# Patient Record
Sex: FEMALE | Race: WHITE | Employment: FULL TIME | URBAN - METROPOLITAN AREA
[De-identification: names, ages, dates, MRNs, and addresses within clinical notes are randomized per-mention and may not be internally consistent; named-entity substitution may affect disease eponyms.]

---

## 2021-08-27 ENCOUNTER — HOSPITAL ENCOUNTER (EMERGENCY)
Age: 46
Discharge: HOME | End: 2021-08-27
Payer: COMMERCIAL

## 2021-08-27 VITALS
WEIGHT: 170 LBS | HEART RATE: 68 BPM | DIASTOLIC BLOOD PRESSURE: 98 MMHG | RESPIRATION RATE: 18 BRPM | OXYGEN SATURATION: 98 % | TEMPERATURE: 97.8 F | SYSTOLIC BLOOD PRESSURE: 177 MMHG

## 2021-08-27 DIAGNOSIS — R03.0 ELEVATED BLOOD PRESSURE READING: ICD-10-CM

## 2021-08-27 DIAGNOSIS — U07.1 COVID-19: Primary | ICD-10-CM

## 2021-08-27 PROCEDURE — 99211 OFF/OP EST MAY X REQ PHY/QHP: CPT

## 2021-08-27 RX ORDER — ASCORBIC ACID 500 MG
500 TABLET ORAL 2 TIMES DAILY
Qty: 14 TABLET | Refills: 0 | Status: SHIPPED | OUTPATIENT
Start: 2021-08-27 | End: 2022-06-25

## 2021-08-27 RX ORDER — ZINC SULFATE 50(220)MG
50 CAPSULE ORAL DAILY
Qty: 7 CAPSULE | Refills: 0 | Status: SHIPPED | OUTPATIENT
Start: 2021-08-27 | End: 2022-06-25

## 2021-08-27 NOTE — ED PROVIDER NOTES
HPI:  8/27/21, Time: 9:21 AM EDT         Carrie Toro is a 40 y.o. female presenting to the ED for evaluation. She has been sick for a few days she has a cough, chest congestion achiness fatigue and sore throat. She took 2 rapid Covid test at home and they were both positive. She is here because she needs a work excuse. She is not complaining of chest pain or shortness of breath. Review of Systems:   A complete review of systems was performed and pertinent positives and negatives are stated within HPI, all other systems reviewed and are negative.          --------------------------------------------- PAST HISTORY ---------------------------------------------  Past Medical History:  has no past medical history on file. Past Surgical History:  has a past surgical history that includes Patent ductus arterious ligation. Social History:  reports that she has never smoked. She has never used smokeless tobacco. She reports that she does not drink alcohol. Family History: family history is not on file. The patients home medications have been reviewed. Allergies: Patient has no known allergies. -------------------------------------------------- RESULTS -------------------------------------------------  All laboratory and radiology results have been personally reviewed by myself   LABS:  No results found for this visit on 08/27/21. RADIOLOGY:  Interpreted by Radiologist.  No orders to display       ------------------------- NURSING NOTES AND VITALS REVIEWED ---------------------------   The nursing notes within the ED encounter and vital signs as below have been reviewed.    BP (!) 177/98   Pulse 68   Temp 97.8 °F (36.6 °C) (Infrared)   Resp 18   Wt 170 lb (77.1 kg)   LMP 08/22/2021   SpO2 98%   Oxygen Saturation Interpretation: Normal      ---------------------------------------------------PHYSICAL EXAM--------------------------------------      Constitutional/General: Alert and oriented x3, well appearing, non toxic in NAD  Head: normocephalic  Eyes:clear conjunctiva  Mouth:  handling secretions, no trismus  Neck: Supple, full ROM,   Pulmonary: Lungs clear to auscultation bilaterally, no wheezes, rales, or rhonchi. Not in respiratory distress  Cardiovascular:  Regular rate and rhythm, no murmurs, gallops, or rubs. 2+ distal pulses  Extremities: Moves all extremities x 4. Warm and well perfused  Skin: warm and dry without rash  Neurologic: GCS 15,  Psych: Normal Affect      ------------------------------ ED COURSE/MEDICAL DECISION MAKING----------------------  Medications - No data to display      ED COURSE:       Medical Decision Making:    Patient appears well she does not appear ill or toxic her sat is 98% her heart rate 68 her temperature is 97.8. She took 2 rapid Covid test at home and they were both positive. She said she is here because she needs a work excuse to be off work because of this. I did advise her that she has to stay in quarantine for 10 days. She has to stay in quarantine longer if she still has symptoms. I did advise her if she has any worsening she needs to go to the emergency department if she develops chest pain weakness shortness of breath or any worsening symptoms. Her blood pressure is elevated today advised her she needs to follow-up with her doctor to have that reevaluated. I did put her on the zinc and vitamin C. She was given a work note.            --------------------------------- [address] ---------------------------------    IMPRESSION  1. COVID-19    2. Elevated blood pressure reading        DISPOSITION  Disposition: Discharge to home  Patient condition is good      NOTE: This report was transcribed using voice recognition software.  Every effort was made to ensure accuracy; however, inadvertent computerized transcription errors may be present     JALEN Wilkes - CNP  08/27/21 [id number]

## 2022-06-24 PROCEDURE — 99283 EMERGENCY DEPT VISIT LOW MDM: CPT

## 2022-06-25 ENCOUNTER — HOSPITAL ENCOUNTER (EMERGENCY)
Age: 47
Discharge: HOME | End: 2022-06-25
Attending: EMERGENCY MEDICINE
Payer: COMMERCIAL

## 2022-06-25 VITALS
WEIGHT: 180 LBS | HEIGHT: 63 IN | OXYGEN SATURATION: 99 % | DIASTOLIC BLOOD PRESSURE: 72 MMHG | TEMPERATURE: 98.1 F | HEART RATE: 63 BPM | RESPIRATION RATE: 16 BRPM | BODY MASS INDEX: 31.89 KG/M2 | SYSTOLIC BLOOD PRESSURE: 148 MMHG

## 2022-06-25 DIAGNOSIS — K04.7 DENTAL INFECTION: Primary | ICD-10-CM

## 2022-06-25 PROCEDURE — 6370000000 HC RX 637 (ALT 250 FOR IP): Performed by: EMERGENCY MEDICINE

## 2022-06-25 RX ORDER — CLINDAMYCIN HYDROCHLORIDE 150 MG/1
300 CAPSULE ORAL ONCE
Status: COMPLETED | OUTPATIENT
Start: 2022-06-25 | End: 2022-06-25

## 2022-06-25 RX ORDER — CLINDAMYCIN HYDROCHLORIDE 150 MG/1
CAPSULE ORAL
Qty: 80 CAPSULE | Refills: 0 | Status: SHIPPED | OUTPATIENT
Start: 2022-06-25

## 2022-06-25 RX ADMIN — CLINDAMYCIN HYDROCHLORIDE 300 MG: 150 CAPSULE ORAL at 03:03

## 2022-06-25 ASSESSMENT — ENCOUNTER SYMPTOMS
BACK PAIN: 0
EYE PAIN: 0
WHEEZING: 0
EYE DISCHARGE: 0
FACIAL SWELLING: 1
SORE THROAT: 0
EYE REDNESS: 0
DIARRHEA: 0
VOMITING: 0
SINUS PRESSURE: 0
ABDOMINAL DISTENTION: 0
SHORTNESS OF BREATH: 0
COUGH: 0
NAUSEA: 0

## 2022-06-25 ASSESSMENT — PAIN - FUNCTIONAL ASSESSMENT
PAIN_FUNCTIONAL_ASSESSMENT: 0-10
PAIN_FUNCTIONAL_ASSESSMENT: NONE - DENIES PAIN

## 2022-06-25 ASSESSMENT — PAIN DESCRIPTION - LOCATION: LOCATION: FACE

## 2022-06-25 ASSESSMENT — PAIN DESCRIPTION - ORIENTATION: ORIENTATION: RIGHT

## 2022-06-25 ASSESSMENT — PAIN SCALES - GENERAL: PAINLEVEL_OUTOF10: 0

## 2022-06-25 NOTE — ED PROVIDER NOTES
Patient is a 55 y/o female who presents to the ED with right facial swelling. Patient states that she has a history of a bad tooth and this morning woke up with right facial swelling. She states that she took Tylenol and ibuprofen, however, the swelling has persisted. She denies any current dental pain. She denies any fever. She does have an appointment with her dentist scheduled for next week. Review of Systems   Constitutional: Negative for chills and fever. HENT: Positive for dental problem and facial swelling. Negative for ear pain, sinus pressure and sore throat. Eyes: Negative for pain, discharge and redness. Respiratory: Negative for cough, shortness of breath and wheezing. Cardiovascular: Negative for chest pain. Gastrointestinal: Negative for abdominal distention, diarrhea, nausea and vomiting. Genitourinary: Negative for dysuria and frequency. Musculoskeletal: Negative for arthralgias and back pain. Skin: Negative for rash and wound. Neurological: Negative for weakness and headaches. Hematological: Negative for adenopathy. All other systems reviewed and are negative. Physical Exam  Vitals and nursing note reviewed. Constitutional:       General: She is not in acute distress. HENT:      Head: Normocephalic and atraumatic. Right Ear: External ear normal.      Left Ear: External ear normal.      Nose: Nose normal.      Mouth/Throat:      Mouth: Mucous membranes are moist.      Pharynx: Oropharynx is clear. Comments: No obvious dental caries, abscess or ANUG. Eyes:      Conjunctiva/sclera: Conjunctivae normal.      Pupils: Pupils are equal, round, and reactive to light. Cardiovascular:      Rate and Rhythm: Normal rate and regular rhythm. Heart sounds: No murmur heard. Pulmonary:      Effort: Pulmonary effort is normal. No respiratory distress. Breath sounds: Normal breath sounds. No stridor. No wheezing, rhonchi or rales.    Abdominal: General: Bowel sounds are normal. There is no distension. Palpations: Abdomen is soft. Tenderness: There is no abdominal tenderness. There is no guarding. Musculoskeletal:         General: Normal range of motion. Cervical back: Normal range of motion and neck supple. Lymphadenopathy:      Cervical: No cervical adenopathy. Skin:     General: Skin is warm and dry. Neurological:      Mental Status: She is alert and oriented to person, place, and time. Procedures     [facility]             --------------------------------------------- PAST HISTORY ---------------------------------------------  Past Medical History:  has no past medical history on file. Past Surgical History:  has a past surgical history that includes Patent ductus arterious ligation. Social History:  reports that she has never smoked. She has never used smokeless tobacco. She reports that she does not drink alcohol and does not use drugs. Family History: family history is not on file. The patients home medications have been reviewed. Allergies: Patient has no known allergies. -------------------------------------------------- RESULTS -------------------------------------------------  Labs:  No results found for this visit on 06/25/22. Radiology:  No orders to display       ------------------------- NURSING NOTES AND VITALS REVIEWED ---------------------------  Date / Time Roomed:  6/25/2022 12:03 AM  ED Bed Assignment:  26/26    The nursing notes within the ED encounter and vital signs as below have been reviewed.    BP (!) 156/94   Pulse 67   Temp 98.3 °F (36.8 °C)   Resp 18   Ht 5' 3\" (1.6 m)   Wt 180 lb (81.6 kg)   LMP 06/25/2022 (Exact Date)   SpO2 98%   BMI 31.89 kg/m²   Oxygen Saturation Interpretation: Normal      ------------------------------------------ PROGRESS NOTES ------------------------------------------  I have spoken with the patient and discussed todays results, in addition to providing specific details for the plan of care and counseling regarding the diagnosis and prognosis. Their questions are answered at this time and they are agreeable with the plan. I discussed at length with them reasons for immediate return here for re evaluation. They will followup with primary care by calling their office tomorrow. --------------------------------- ADDITIONAL PROVIDER NOTES ---------------------------------  At this time the patient is without objective evidence of an acute process requiring hospitalization or inpatient management. They have remained hemodynamically stable throughout their entire ED visit and are stable for discharge with outpatient follow-up. The plan has been discussed in detail and they are aware of the specific conditions for emergent return, as well as the importance of follow-up. New Prescriptions    CLINDAMYCIN (CLEOCIN) 150 MG CAPSULE    TWO 150MG TABLETS FOUR TIMES A DAY       Diagnosis:  1. Dental infection        Disposition:  Patient's disposition: Discharge to home  Patient's condition is stable.          Javan Murray DO  06/25/22 [id number]

## 2025-04-29 ENCOUNTER — TELEPHONE (OUTPATIENT)
Dept: CARDIOLOGY | Age: 50
End: 2025-04-29

## 2025-04-29 DIAGNOSIS — R00.2 PALPITATIONS: Primary | ICD-10-CM

## 2025-04-29 NOTE — TELEPHONE ENCOUNTER
Called patient and left message on phone number provided in the order from [facility] which is [phone] to schedule echo    Electronically signed by Angelica Kwok on 4/29/2025 at 10:10 AM

## 2025-05-15 ENCOUNTER — HOSPITAL ENCOUNTER (OUTPATIENT)
Dept: CARDIOLOGY | Age: 50
Discharge: HOME | End: 2025-05-17
Attending: FAMILY MEDICINE
Payer: COMMERCIAL

## 2025-05-15 VITALS
WEIGHT: 180 LBS | SYSTOLIC BLOOD PRESSURE: 148 MMHG | BODY MASS INDEX: 31.89 KG/M2 | HEIGHT: 63 IN | DIASTOLIC BLOOD PRESSURE: 72 MMHG

## 2025-05-15 DIAGNOSIS — R00.2 PALPITATIONS: ICD-10-CM

## 2025-05-15 PROCEDURE — 93306 TTE W/DOPPLER COMPLETE: CPT

## 2025-05-16 LAB
ECHO AO ASC DIAM: 2.9 CM
ECHO AO ASCENDING AORTA INDEX: 1.57 CM/M2
ECHO AV AREA PEAK VELOCITY: 1.5 CM2
ECHO AV AREA VTI: 1.5 CM2
ECHO AV AREA/BSA PEAK VELOCITY: 0.8 CM2/M2
ECHO AV AREA/BSA VTI: 0.8 CM2/M2
ECHO AV CUSP MM: 2.2 CM
ECHO AV MEAN GRADIENT: 22 MMHG
ECHO AV MEAN VELOCITY: 2.2 M/S
ECHO AV PEAK GRADIENT: 39 MMHG
ECHO AV PEAK VELOCITY: 3.1 M/S
ECHO AV VELOCITY RATIO: 0.39
ECHO AV VTI: 68.2 CM
ECHO BSA: 1.9 M2
ECHO EST RA PRESSURE: 3 MMHG
ECHO LA DIAMETER INDEX: 2.16 CM/M2
ECHO LA DIAMETER: 4 CM
ECHO LA VOL A-L A2C: 34 ML (ref 22–52)
ECHO LA VOL A-L A4C: 31 ML (ref 22–52)
ECHO LA VOL MOD A2C: 32 ML (ref 22–52)
ECHO LA VOL MOD A4C: 31 ML (ref 22–52)
ECHO LA VOLUME AREA LENGTH: 33 ML
ECHO LA VOLUME INDEX A-L A2C: 18 ML/M2 (ref 16–34)
ECHO LA VOLUME INDEX A-L A4C: 17 ML/M2 (ref 16–34)
ECHO LA VOLUME INDEX AREA LENGTH: 18 ML/M2 (ref 16–34)
ECHO LA VOLUME INDEX MOD A2C: 17 ML/M2 (ref 16–34)
ECHO LA VOLUME INDEX MOD A4C: 17 ML/M2 (ref 16–34)
ECHO LV EF PHYSICIAN: 60 %
ECHO LV FRACTIONAL SHORTENING: 38 % (ref 28–44)
ECHO LV INTERNAL DIMENSION DIASTOLE INDEX: 2.43 CM/M2
ECHO LV INTERNAL DIMENSION DIASTOLIC: 4.5 CM (ref 3.9–5.3)
ECHO LV INTERNAL DIMENSION SYSTOLIC INDEX: 1.51 CM/M2
ECHO LV INTERNAL DIMENSION SYSTOLIC: 2.8 CM
ECHO LV ISOVOLUMETRIC RELAXATION TIME (IVRT): 62.3 MS
ECHO LV IVSD: 1.2 CM (ref 0.6–0.9)
ECHO LV IVSS: 1.7 CM
ECHO LV MASS 2D: 210.2 G (ref 67–162)
ECHO LV MASS INDEX 2D: 113.6 G/M2 (ref 43–95)
ECHO LV POSTERIOR WALL DIASTOLIC: 1.3 CM (ref 0.6–0.9)
ECHO LV POSTERIOR WALL SYSTOLIC: 1.6 CM
ECHO LV RELATIVE WALL THICKNESS RATIO: 0.58
ECHO LVOT AREA: 3.8 CM2
ECHO LVOT AV VTI INDEX: 0.4
ECHO LVOT DIAM: 2.2 CM
ECHO LVOT MEAN GRADIENT: 3 MMHG
ECHO LVOT PEAK GRADIENT: 6 MMHG
ECHO LVOT PEAK VELOCITY: 1.2 M/S
ECHO LVOT STROKE VOLUME INDEX: 56.3 ML/M2
ECHO LVOT SV: 104.1 ML
ECHO LVOT VTI: 27.4 CM
ECHO MV "A" WAVE DURATION: 138.4 MSEC
ECHO MV A VELOCITY: 0.91 M/S
ECHO MV AREA PHT: 1.8 CM2
ECHO MV AREA VTI: 2.5 CM2
ECHO MV E DECELERATION TIME (DT): 258.2 MS
ECHO MV E VELOCITY: 1.04 M/S
ECHO MV E/A RATIO: 1.14
ECHO MV LVOT VTI INDEX: 1.54
ECHO MV MAX VELOCITY: 1.2 M/S
ECHO MV MEAN GRADIENT: 2 MMHG
ECHO MV MEAN VELOCITY: 0.6 M/S
ECHO MV PEAK GRADIENT: 6 MMHG
ECHO MV PRESSURE HALF TIME (PHT): 120.9 MS
ECHO MV VTI: 42.2 CM
ECHO PV MAX VELOCITY: 1.8 M/S
ECHO PV MEAN GRADIENT: 8 MMHG
ECHO PV MEAN VELOCITY: 1.3 M/S
ECHO PV PEAK GRADIENT: 13 MMHG
ECHO PV VTI: 48 CM
ECHO PVEIN A DURATION: 129.2 MS
ECHO PVEIN A VELOCITY: 0.3 M/S
ECHO PVEIN PEAK D VELOCITY: 0.5 M/S
ECHO PVEIN PEAK S VELOCITY: 0.6 M/S
ECHO PVEIN S/D RATIO: 1.2
ECHO RIGHT VENTRICULAR SYSTOLIC PRESSURE (RVSP): 28 MMHG
ECHO RV INTERNAL DIMENSION: 3.4 CM
ECHO TV REGURGITANT MAX VELOCITY: 2.52 M/S
ECHO TV REGURGITANT PEAK GRADIENT: 25 MMHG